# Patient Record
Sex: MALE | Race: WHITE
[De-identification: names, ages, dates, MRNs, and addresses within clinical notes are randomized per-mention and may not be internally consistent; named-entity substitution may affect disease eponyms.]

---

## 2019-06-21 ENCOUNTER — HOSPITAL ENCOUNTER (EMERGENCY)
Dept: HOSPITAL 97 - ER | Age: 64
Discharge: HOME | End: 2019-06-21
Payer: COMMERCIAL

## 2019-06-21 DIAGNOSIS — E11.9: ICD-10-CM

## 2019-06-21 DIAGNOSIS — H53.2: Primary | ICD-10-CM

## 2019-06-21 DIAGNOSIS — E78.5: ICD-10-CM

## 2019-06-21 DIAGNOSIS — Z79.4: ICD-10-CM

## 2019-06-21 DIAGNOSIS — I10: ICD-10-CM

## 2019-06-21 LAB
BUN BLD-MCNC: 27 MG/DL (ref 7–18)
GLUCOSE SERPLBLD-MCNC: 189 MG/DL (ref 74–106)
HCT VFR BLD CALC: 44.5 % (ref 39.6–49)
LYMPHOCYTES # SPEC AUTO: 1.4 K/UL (ref 0.7–4.9)
PMV BLD: 8.9 FL (ref 7.6–11.3)
POTASSIUM SERPL-SCNC: 4.2 MMOL/L (ref 3.5–5.1)
RBC # BLD: 5.48 M/UL (ref 4.33–5.43)

## 2019-06-21 PROCEDURE — 80048 BASIC METABOLIC PNL TOTAL CA: CPT

## 2019-06-21 PROCEDURE — 99284 EMERGENCY DEPT VISIT MOD MDM: CPT

## 2019-06-21 PROCEDURE — 93005 ELECTROCARDIOGRAM TRACING: CPT

## 2019-06-21 PROCEDURE — 70450 CT HEAD/BRAIN W/O DYE: CPT

## 2019-06-21 PROCEDURE — 85025 COMPLETE CBC W/AUTO DIFF WBC: CPT

## 2019-06-21 PROCEDURE — 36415 COLL VENOUS BLD VENIPUNCTURE: CPT

## 2019-06-21 PROCEDURE — 70551 MRI BRAIN STEM W/O DYE: CPT

## 2019-06-21 NOTE — RAD REPORT
EXAM DESCRIPTION:  CT - Head Brain Wo Cont - 6/21/2019 7:51 am

 

CLINICAL HISTORY:  dizziness,blurred vision

Headache, drowsiness, visual disturbances

 

COMPARISON:  <Comparisons>

 

TECHNIQUE:  All CT scans are performed using dose optimization technique as appropriate and may inclu
de automated exposure control or mA/KV adjustment according to patient size.

 

FINDINGS:  No intracranial hemorrhage, hydrocephalus or extra-axial fluid collection.No areas of brai
n edema or evidence of midline shift.

 

The paranasal sinuses and mastoids are clear. The calvarium is intact.

 

IMPRESSION:  No acute intracranial abnormality.

## 2019-06-21 NOTE — EDPHYS
Physician Documentation                                                                           

 [facility]                                                                 

Name: Liam Díaz                                                                             

Age: 63 yrs                                                                                       

Sex: Male                                                                                         

: 1955                                                                                   

MRN: [id number]                                                                                   

Arrival Date: 2019                                                                          

Time: 07:09                                                                                       

Account#: [id number]                                                                            

Bed 6                                                                                             

Private MD:                                                                                       

ED Physician Anatoly Scherer                                                                         

HPI:                                                                                              

                                                                                             

07:23 This 63 yrs old  Male presents to ER via Ambulatory with complaints of         rn  

      Dizziness, double vision.                                                                   

07:23 The patient presents with dizziness. Onset: The symptoms/episode began/occurred         rn  

      yesterday. Modifying factors: The symptoms are alleviated by closing eyes, the symptoms     

      are aggravated by nothing. Severity of symptoms: At their worst the symptoms were mild      

      in the emergency department the symptoms are unchanged. The patient has not experienced     

      similar symptoms in the past. The patient has not recently seen a physician. Reports        

      double vision that began yesterday, no blurred or decreased vision, corrected by            

      closing either eye, no headache or trauma, takes aspirin, here because in past had          

      spontaneous head bleed and required surgery. Does not feel like that episode but wants      

      to make sure. No other neurological complaint. Feels dizzy due to double vision..           

                                                                                                  

Historical:                                                                                       

- Allergies:                                                                                      

07:25 No Known Allergies;                                                                     iw  

- Home Meds:                                                                                      

07:25 amlodipine-benazepril 5-40 mg oral cap 1 cap once daily [Active]; Centrum Silver        iw  

      0.4-300-250 mg-mcg-mcg oral tab daily [Active]; Cinnamon 500 mg oral cap daily              

      [Active]; omeprazole 40 mg Oral cpDR 1 cap 2 times per day [Active]; Ecotrin 81 mg Oral     

      TbEC 1 tab once daily [Active]; escitalopram oxalate 20 mg oral tab 1 tab once daily        

      [Active]; rosuvastatin 20 mg oral tab 1 tab once daily [Active]; Novolin R 40 units         

      Sub-Q daily [Active]; Vascepa 1 gram oral cap daily [Active]; iron oral 65 mg oral once     

      daily [Active]; metoprolol tartrate 25 mg Oral tab 1 tab 2 times per day [Active];          

      Omega 3-6-9 40-60-10 mg-mg-unit oral cap daily [Active]; potassium chloride 20 mEq Oral     

      TbER 1 tab once daily [Active]; Vitamin C Oral twice a day [Active];                        

- PMHx:                                                                                           

07:52 Diabetes - IDDM; Hypertension; Hyperlipidemia;                                          iw  

- PSHx:                                                                                           

07:52 Heart stents; oc holes;                                                               iw  

                                                                                                  

- Immunization history:: Adult Immunizations up to date.                                          

- Social history:: Smoking status: Patient/guardian denies using tobacco.                         

- Family history:: not pertinent.                                                                 

- Ebola Screening: : Patient negative for fever greater than or equal to 101.5 degrees            

  Fahrenheit, and additional compatible Ebola Virus Disease symptoms Patient denies               

  exposure to infectious person Patient denies travel to an Ebola-affected area in the            

  21 days before illness onset No symptoms or risks identified at this time.                      

- Hospitalizations: : No recent hospitalization is reported.                                      

                                                                                                  

                                                                                                  

ROS:                                                                                              

07:25 Constitutional: Negative for fever, chills, and weight loss, Eyes: + double vision, no  rn  

      loss or decreased vision, no drainage or trauma Cardiovascular: Negative for chest          

      pain, palpitations, and edema, Respiratory: Negative for shortness of breath, cough,        

      wheezing, and pleuritic chest pain, Abdomen/GI: Negative for abdominal pain, nausea,        

      vomiting, diarrhea, and constipation, MS/Extremity: Negative for injury and deformity,      

      Skin: Negative for injury, rash, and discoloration, Neuro: Negative for headache,           

      weakness, numbness, tingling, and seizure.                                                  

                                                                                                  

Exam:                                                                                             

07:25 Constitutional:  This is a well developed, well nourished patient who is awake, alert,  rn  

      and in no acute distress. Head/Face:  Normocephalic, atraumatic. Eyes:  Pupils equal        

      round and reactive to light.  Questionable left lateral rectus partial palsy.  Lids and     

      lashes normal.  Conjunctiva and sclera are non-icteric and not injected.  Cornea within     

      normal limits.  Periorbital areas with no swelling, redness, or edema.  Diplopia            

      corrects with closure of either eye.  All visual fields intact to confrontation. ENT:       

      MMM Cardiovascular:  Regular rate and rhythm.  No pulse deficits. Respiratory:  Lungs       

      have equal breath sounds bilaterally, clear to auscultation.  No increased work of          

      breathing, no retractions or nasal flaring. MS/ Extremity:  Pulses equal, no cyanosis.      

      Neurovascular intact.  Full, normal range of motion.  Equal circumference. Neuro:           

      Awake and alert, GCS 15, oriented to person, place, time, and situation.  Cranial           

      nerves II-XII grossly intact.  Motor strength 5/5 in all extremities.  Sensory grossly      

      intact.  Cerebellar exam normal.  Normal gait.                                              

07:44 ECG was reviewed by the Attending Physician.                                            rn  

                                                                                                  

Vital Signs:                                                                                      

07:25  / 85; Pulse 65; Resp 16; Temp 98.3; Pulse Ox 97% on R/A; Weight 108.86 kg;       iw  

      Height 5 ft. 10 in. (177.80 cm); Pain 0/10;                                                 

08:00  / 64; Pulse 64; Resp 16 S; Pulse Ox 96% on R/A;                                  aa5 

09:05  / 77; Pulse 66; Resp 18 S; Temp 98.0(TE); Pulse Ox 98% on R/A; Pain 0/10;        aa5 

07:25 Body Mass Index 34.44 (108.86 kg, 177.80 cm)                                            iw  

                                                                                                  

NIH Stroke Scale Scores:                                                                          

07:20 NIHSS Score: 0                                                                          aa5 

                                                                                                  

MDM:                                                                                              

07:13 Patient medically screened.                                                             rn  

07:25 ED course: Reports just went to eye doctor and had stress test, both were ok, has       rn  

      cataract but otherwise normal..                                                             

09:33 Differential diagnosis: CVA, generalized weakness, idiopathic dizziness, diplopia,      rn  

      cranial nerve palsy, diabetic complication. Data reviewed: vital signs, nurses notes,       

      lab test result(s), EKG, radiologic studies, CT scan, MRI, and as a result, I will          

      discharge patient. Counseling: I had a detailed discussion with the patient and/or          

      guardian regarding: the historical points, exam findings, and any diagnostic results        

      supporting the discharge/admit diagnosis, lab results, radiology results, the need for      

      outpatient follow up, to return to the emergency department if symptoms worsen or           

      persist or if there are any questions or concerns that arise at home. Special               

      discussion: I discussed with the patient/guardian in detail that at this point there is     

      no indication for admission to the hospital. It is understood, however, that if the         

      symptoms persist or worsen the patient needs to return immediately for re-evaluation.       

      Based on the history and exam findings, there is no indication for further emergent         

      testing or inpatient evaluation. I discussed with the patient/guardian the need to see      

      the opthamologist for further evaluation of the symptoms, I discussed with the              

      patient/guardian the need to see the primary care provider for further evaluation of        

      the symptoms.                                                                               

                                                                                                  

                                                                                             

07: Order name: CBC with Diff                                                               rn  

07: Order name: Basic Metabolic Panel                                                       rn  

07:22 Order name: CT Head Brain wo Cont                                                       rn  

                                                                                             

07:37 Order name: Basic Metabolic Panel; Complete Time: 08:19                                 EDMS

                                                                                             

07:37 Order name: CBC with Automated Diff; Complete Time: 08:19                               EDMS

                                                                                             

07:42 Order name: Head Brain Wo Cont; Complete Time: 08:19                                    EDMS

                                                                                             

07:22 Order name: IV Start; Complete Time: 07:35                                              rn  

                                                                                             

07:22 Order name: EKG; Complete Time: 08:25                                                   rn  

                                                                                             

07:22 Order name: EKG - Nurse/Tech; Complete Time: 07:42                                      rn  

                                                                                             

07:42 Order name: EKG Electrocardiogram                                                       EDMS

                                                                                             

08:20 Order name: Brain Wo Cont MRI                                                           rn  

                                                                                             

09:22 Order name: MRI; Complete Time: 09:32                                                   EDMS

                                                                                                  

EC:44 Rate is 66 beats/min. Rhythm is regular. QRS Axis is Normal. HI interval is normal. QRS rn  

      interval is normal. QT interval is normal. No Q waves. T waves are Normal. No ST            

      changes noted. Clinical impression: Normal ECG. Interpreted by me. Reviewed by me.          

                                                                                                  

Administered Medications:                                                                         

No medications were administered                                                                  

                                                                                                  

                                                                                                  

Disposition:                                                                                      

19 09:34 Discharged to Home. Impression: Diplopia.                                          

- Condition is Stable.                                                                            

- Discharge Instructions: Diplopia.                                                               

                                                                                                  

- Medication Reconciliation Form, Thank You Letter, Antibiotic Education, Prescription            

  Opioid Use form.                                                                                

- Follow up: Cheryl Steinberg MD; When: As needed; Reason: Recheck today's complaints,             

  Re-evaluation by your physician.                                                                

- Problem is new.                                                                                 

- Symptoms are unchanged.                                                                         

                                                                                                  

                                                                                                  

                                                                                                  

                                                                                                  

NIH Stroke Scale - NIH Stroke Score                                                               

Date: 2019                                                                                  

Time: 07:20                                                                                       

Total Score = 0                                                                                   

  1a. Level of Consciousness (LOC) - 0(Alert)                                                     

  1b. Level of Consciousness (LOC) (Year \T\ Age) - 0(Both)                                       

  1c. LOC Commands (Open \T\ Closes Eyes/) - 0(Both)                                          

   2. Best Gaze (Lateral Gaze Paresis) - 0(Normal)                                                

   3. Visual Field Loss - 0(No visual loss)                                                       

   4. Facial Palsy - 0(Normal)                                                                    

  5a. Left Arm: Motor (10-second hold) - 0(No drift)                                              

  5b. Right Arm: Motor (10-second hold) - 0(No drift)                                             

  6a. Left Leg: Motor (5-second hold - always test supine) - 0(No drift)                          

  6b. Right Leg: Motor (5-second hold - always test supine) - 0(No drift)                         

   7. Limb Ataxia (finger/nose \T\ heel/shin - test with eyes open) - 0(Absent)                   

   8. Sensory Loss (pinprick arms/legs/face) - 0(Normal)                                          

   9. Best Language: Aphasia (description/naming/reading) - 0(No aphasia)                         

  10. Dysarthria (speech clarity - read or repeat words) - 0(Normal)                              

  11. Extinction and Inattention (visual/tactile/auditory/spatial/personal) - 0(No                

      abnormality)                                                                                

Initials: aa5                                                                                     

                                                                                                  

Signatures:                                                                                       

Dispatcher [employer]                           EDMichelle Obregon RN RN iw Nieto, Roman, MD MD rn Calderon, Audri, RN RN   aa5                                                  

                                                                                                  

Corrections: (The following items were deleted from the chart)                                    

07:44 07:42 Head Brain Wo Cont ordered. EDMS                                          EDMS        

09:48 09:34 2019 09:34 Discharged to Home. Impression: Diplopia. Condition is   aa5         

      Stable. Forms are Medication Reconciliation Form, Thank You Letter, Antibiotic              

      Education, Prescription Opioid Use. Follow up: Cheryl Steinberg; When: As needed;              

      Reason: Recheck today's complaints, Re-evaluation by your physician. Problem is             

      new. Symptoms are unchanged. rn                                                             

                                                                                                  

**************************************************************************************************

## 2019-06-21 NOTE — EKG
Test Date:    2019-06-21               Test Time:    07:44:38

Technician:   LIZ                                     

                                                     

MEASUREMENT RESULTS:                                       

Intervals:                                           

Rate:         66                                     

OH:           156                                    

QRSD:         108                                    

QT:           420                                    

QTc:          440                                    

Axis:                                                

P:            38                                     

OH:           156                                    

QRS:          49                                     

T:            60                                     

                                                     

INTERPRETIVE STATEMENTS:                                       

                                                     

Normal sinus rhythm

Normal ECG

No previous ECG available for comparison



Electronically Signed On 06-21-19 10:47:35 CDT by Ganesh Lui

## 2019-06-21 NOTE — ER
Nurse's Notes                                                                                     

 [facility]                                                                 

Name: Liam Díaz                                                                             

Age: 63 yrs                                                                                       

Sex: Male                                                                                         

: 1955                                                                                   

MRN: [id number]                                                                                   

Arrival Date: 2019                                                                          

Time: 07:09                                                                                       

Account#: [id number]                                                                            

Bed 6                                                                                             

Private MD:                                                                                       

Diagnosis: Diplopia                                                                               

                                                                                                  

Presentation:                                                                                     

                                                                                             

07:20 Presenting complaint: Patient states: woke up with dizziness and double vision          iw  

      yesterday morning, hx of brain bleed 6 months ago, states he was on plavix for a long       

      time, denies trauma, denies headache or weakness today, reports felling tingling in         

      right fingertips. Transition of care: patient was not received from another setting of      

      care. Onset of symptoms was 2019. Risk Assessment: Do you want to hurt             

      yourself or someone else? Patient reports no desire to harm self or others. Initial         

      Sepsis Screen: Does the patient meet any 2 criteria? No. Patient's initial sepsis           

      screen is negative. Does the patient have a suspected source of infection?. Care prior      

      to arrival: None.                                                                           

07:20 Method Of Arrival: Ambulatory                                                           iw  

07:20 Acuity: MANUEL 2                                                                           iw  

                                                                                                  

Historical:                                                                                       

- Allergies:                                                                                      

07:25 No Known Allergies;                                                                     iw  

- Home Meds:                                                                                      

07:25 amlodipine-benazepril 5-40 mg oral cap 1 cap once daily [Active]; Centrum Silver        iw  

      0.4-300-250 mg-mcg-mcg oral tab daily [Active]; Cinnamon 500 mg oral cap daily              

      [Active]; omeprazole 40 mg Oral cpDR 1 cap 2 times per day [Active]; Ecotrin 81 mg Oral     

      TbEC 1 tab once daily [Active]; escitalopram oxalate 20 mg oral tab 1 tab once daily        

      [Active]; rosuvastatin 20 mg oral tab 1 tab once daily [Active]; Novolin R 40 units         

      Sub-Q daily [Active]; Vascepa 1 gram oral cap daily [Active]; iron oral 65 mg oral once     

      daily [Active]; metoprolol tartrate 25 mg Oral tab 1 tab 2 times per day [Active];          

      Omega 3-6-9 40-60-10 mg-mg-unit oral cap daily [Active]; potassium chloride 20 mEq Oral     

      TbER 1 tab once daily [Active]; Vitamin C Oral twice a day [Active];                        

- PMHx:                                                                                           

07:52 Diabetes - IDDM; Hypertension; Hyperlipidemia;                                          iw  

- PSHx:                                                                                           

07:52 Heart stents; oc holes;                                                               iw  

                                                                                                  

- Immunization history:: Adult Immunizations up to date.                                          

- Social history:: Smoking status: Patient/guardian denies using tobacco.                         

- Family history:: not pertinent.                                                                 

- Ebola Screening: : Patient negative for fever greater than or equal to 101.5 degrees            

  Fahrenheit, and additional compatible Ebola Virus Disease symptoms Patient denies               

  exposure to infectious person Patient denies travel to an Ebola-affected area in the            

  21 days before illness onset No symptoms or risks identified at this time.                      

- Hospitalizations: : No recent hospitalization is reported.                                      

                                                                                                  

                                                                                                  

Screenin:36 Abuse screen: Denies threats or abuse. Nutritional screening: No deficits noted.        aa5 

      Tuberculosis screening: No symptoms or risk factors identified.                             

08:00 Fall Risk IV access (20 points). Total Cody Fall Scale indicates No Risk (0-24 pts).   aa5 

                                                                                                  

Assessment:                                                                                       

07:20 General: Appears comfortable, Behavior is calm, cooperative. Pain: Denies pain. Neuro:  aa5 

      Level of Consciousness is awake, alert, obeys commands, Oriented to person, place,          

      time, situation,  are equal bilaterally Moves all extremities. Gait is steady,         

      Speech is normal, Facial symmetry appears normal, Pupils are PERRLA, Reports diplopia,      

      dizziness, tingling to right fingertips . Denies weakness blurred vision headache.          

      Cardiovascular: Heart tones S1 S2 present Rhythm is regular. Respiratory: Airway is         

      patent Respiratory effort is even, unlabored, Respiratory pattern is regular,               

      symmetrical. GI: No signs and/or symptoms were reported involving the gastrointestinal      

      system. : No signs and/or symptoms were reported regarding the genitourinary system.      

      EENT: No signs and/or symptoms were reported regarding the EENT system. Derm: Skin is       

      pink, warm \T\ dry. Musculoskeletal: Range of motion: intact in all extremities.            

07:45 Reassessment: Pt taken to CT via stretcher .                                            aa5 

08:30 Reassessment: Pt to MRI.                                                                aa5 

09:00 Reassessment: Patient is alert, oriented x 3, equal unlabored respirations, skin        aa5 

      warm/dry/pink. Pt back from MRI. Pt sitting up watching TV. Pt notified of wait time        

      for MRI results. Pt verbalized understanding. Pt reports diplopia has not improved. .       

09:45 Reassessment: Patient is alert, oriented x 3, equal unlabored respirations, skin        aa5 

      warm/dry/pink. Patient denies pain at this time.                                            

                                                                                                  

Vital Signs:                                                                                      

07:25  / 85; Pulse 65; Resp 16; Temp 98.3; Pulse Ox 97% on R/A; Weight 108.86 kg;       iw  

      Height 5 ft. 10 in. (177.80 cm); Pain 0/10;                                                 

08:00  / 64; Pulse 64; Resp 16 S; Pulse Ox 96% on R/A;                                  aa5 

09:05  / 77; Pulse 66; Resp 18 S; Temp 98.0(TE); Pulse Ox 98% on R/A; Pain 0/10;        aa5 

07:25 Body Mass Index 34.44 (108.86 kg, 177.80 cm)                                            iw  

                                                                                                  

NIH Stroke Scale Scores:                                                                          

07:20 NIHSS Score: 0                                                                          aa5 

                                                                                                  

ED Course:                                                                                        

07:09 Patient arrived in ED.                                                                  mr  

07:13 Anatoly Scherer MD is Attending Physician.                                                rn  

07:14 Teresa Jones, RN is Primary Nurse.                                                   aa5 

07:20 Arm band placed on Patient placed in an exam room, on a stretcher.                      aa5 

07:20 Patient has correct armband on for positive identification. Bed in low position. Call   aa5 

      light in reach. Side rails up X 1.                                                          

07:21 Triage completed.                                                                       iw  

07:30 Initial lab(s) drawn, by me, sent to lab. Inserted saline lock: 20 gauge in right       aa5 

      antecubital area, using aseptic technique. Blood collected.                                 

07:44 EKG done, by EKG tech. reviewed by Anatoly Scherer MD.                                      at1 

07:47 No provider procedures requiring assistance completed.                                  aa5 

07:51 Head Brain Wo Cont In Process Unspecified.                                              EDMS

08:43 Patient moved to MRI via wheelchair.                                                    em2 

09:21 MRI completed. Patient tolerated well. Patient moved back from MRI.                     em2 

09:34 Cheryl Steinberg MD is Referral Physician.                                               rn  

09:45 IV discontinued, intact, bleeding controlled, No redness/swelling at site. Pressure     aa5 

      dressing applied.                                                                           

                                                                                                  

Administered Medications:                                                                         

No medications were administered                                                                  

                                                                                                  

                                                                                                  

Outcome:                                                                                          

09:34 Discharge ordered by MD.                                                                rn  

09:45 Discharged to home ambulatory.                                                          aa5 

09:45 Condition: stable                                                                           

09:45 Discharge instructions given to patient, Instructed on discharge instructions, follow       

      up and referral plans. Demonstrated understanding of instructions, follow-up care.          

09:48 Patient left the ED.                                                                    aa5 

                                                                                                  

                                                                                                  

NIH Stroke Scale - NIH Stroke Score                                                               

Date: 2019                                                                                  

Time: 07:20                                                                                       

Total Score = 0                                                                                   

  1a. Level of Consciousness (LOC) - 0(Alert)                                                     

  1b. Level of Consciousness (LOC) (Year \T\ Age) - 0(Both)                                       

  1c. LOC Commands (Open \T\ Closes Eyes/) - 0(Both)                                          

   2. Best Gaze (Lateral Gaze Paresis) - 0(Normal)                                                

   3. Visual Field Loss - 0(No visual loss)                                                       

   4. Facial Palsy - 0(Normal)                                                                    

  5a. Left Arm: Motor (10-second hold) - 0(No drift)                                              

  5b. Right Arm: Motor (10-second hold) - 0(No drift)                                             

  6a. Left Leg: Motor (5-second hold - always test supine) - 0(No drift)                          

  6b. Right Leg: Motor (5-second hold - always test supine) - 0(No drift)                         

   7. Limb Ataxia (finger/nose \T\ heel/shin - test with eyes open) - 0(Absent)                   

   8. Sensory Loss (pinprick arms/legs/face) - 0(Normal)                                          

   9. Best Language: Aphasia (description/naming/reading) - 0(No aphasia)                         

  10. Dysarthria (speech clarity - read or repeat words) - 0(Normal)                              

  11. Extinction and Inattention (visual/tactile/auditory/spatial/personal) - 0(No                

      abnormality)                                                                                

Initials: aa5                                                                                     

                                                                                                  

Signatures:                                                                                       

Dispatcher [employer]                           [facility]                                                 

Joy White Irene, LESLEE CAMILO   iw                                                   

Anatoly Scherer MD MD rn Calderon, Audri, RN                     RN   aa5                                                  

Vijay Huynh                              em2                                                  

Nae Leon, EKG Tech              EKG Tat1                                                  

                                                                                                  

Corrections: (The following items were deleted from the chart)                                    

07:50 07:25  / 85; Pulse 65bpm; Resp 16bpm; Pulse Ox 97% RA; Temp 98.3F; iw     iw          

09:06 09:05 Pulse 66bpm; Resp 18bpm; Spontaneous; Pulse Ox 98% RA; Temp 98.0F         aa5         

      Temporal; Pain 0/10; aa5                                                                    

09:06 09:00 Reassessment: Patient is alert, oriented x 3, equal unlabored             aa5         

      respirations, skin warm/dry/pink. Pt back from MRI. aa5                                     

                                                                                                  

**************************************************************************************************

## 2019-06-21 NOTE — RAD REPORT
EXAM DESCRIPTION:  MRI - Brain Wo Cont - 6/21/2019 8:54 am

 

CLINICAL HISTORY:  Dizziness, blurred vision, stroke-like symptoms

 

COMPARISON:  CT June 21

 

TECHNIQUE:  Sagittal T1-weighted images were obtained along with axial PD, heavily T2-weighted and T2
-FLAIR images. Axial DWI and ADC mapping sequences were also obtained along with coronal heavily T2-w
eighted images.

 

FINDINGS:  No intracranial hemorrhage, mass or acute infarction. There is no edema or shift of midlin
e structures. No extra-axial fluid collections. Gray-matter/white matter junction is preserved. Signa
l voids are seen as a normal finding in the major intracranial vessels.

 

Mild atrophy changes are present. Ventricles are in proportion to the amount of volume loss. Punctate
 white matter signal abnormalities are scattered in the cerebral white matter. In a patient this age 
this is most likely chronic ischemic change. Basal ganglia, thalamus and brainstem tissue clear of an
y measurable chronic ischemic change.

 

Mastoid air cells and paranasal sinuses are clear.

 

 

IMPRESSION:  Mild atrophy and mild chronic ischemic change. No acute intracranial finding.

## 2019-08-07 ENCOUNTER — HOSPITAL ENCOUNTER (EMERGENCY)
Dept: HOSPITAL 97 - ER | Age: 64
Discharge: HOME | End: 2019-08-07
Payer: COMMERCIAL
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
## 2019-08-07 DIAGNOSIS — S62.601B: Primary | ICD-10-CM

## 2019-08-07 DIAGNOSIS — Z23: ICD-10-CM
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
## 2019-08-07 DIAGNOSIS — S61.311A: ICD-10-CM

## 2019-08-07 DIAGNOSIS — E11.9: ICD-10-CM

## 2019-08-07 DIAGNOSIS — Z95.818: ICD-10-CM

## 2019-08-07 DIAGNOSIS — I10: ICD-10-CM
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
## 2019-08-07 DIAGNOSIS — Z79.4: ICD-10-CM

## 2019-08-07 DIAGNOSIS — E78.5: ICD-10-CM

## 2019-08-07 PROCEDURE — 12001 RPR S/N/AX/GEN/TRNK 2.5CM/<: CPT

## 2019-08-07 PROCEDURE — 90471 IMMUNIZATION ADMIN: CPT

## 2019-08-07 PROCEDURE — 96372 THER/PROPH/DIAG INJ SC/IM: CPT

## 2019-08-07 PROCEDURE — 73130 X-RAY EXAM OF HAND: CPT

## 2019-08-07 PROCEDURE — 99284 EMERGENCY DEPT VISIT MOD MDM: CPT

## 2019-08-07 PROCEDURE — 90714 TD VACC NO PRESV 7 YRS+ IM: CPT

## 2019-08-07 PROCEDURE — 0JQK0ZZ REPAIR LEFT HAND SUBCUTANEOUS TISSUE AND FASCIA, OPEN APPROACH: ICD-10-PCS

## 2019-08-07 NOTE — EDPHYS
Physician Documentation                                                                           

 [facility]                                                                 

Name: Liam Díaz                                                                             

Age: 64 yrs                                                                                       

Sex: Male                                                                                         

: 1955                                                                                   

MRN: [id number]                                                                                   

Arrival Date: 2019                                                                          

Time: 17:04                                                                                       

Account#: [id number]                                                                            

Bed 13                                                                                            

Private MD:                                                                                       

ED Physician Ramsey Link                                                                      

HPI:                                                                                              

                                                                                             

17:39 This 64 yrs old  Male presents to ER via Ambulatory with complaints of Finger  jr8 

      Injury.                                                                                     

17:39 Onset: The symptoms/episode began/occurred acutely, today. The patient has not          jr8 

      experienced similar symptoms in the past. The patient has not recently seen a               

      physician. Was using table saw and kicked back causing laceration distal tip second         

      digit affecting nail bed as well .                                                          

                                                                                                  

Historical:                                                                                       

- Allergies:                                                                                      

17:09 No Known Allergies;                                                                     aj  

- Home Meds:                                                                                      

17:09 amlodipine-benazepril 5-40 mg Oral cap 1 cap once daily [Active]; Centrum Silver        aj  

      0.4-300-250 mg-mcg-mcg Oral tab daily [Active]; Cinnamon 500 mg Oral cap daily              

      [Active]; Ecotrin 81 mg Oral TbEC 1 tab once daily [Active]; escitalopram oxalate 20 mg     

      Oral tab 1 tab once daily [Active]; iron 65 mg Oral once daily [Active]; metoprolol         

      tartrate 25 mg Oral tab 1 tab 2 times per day [Active]; Novolin R 40 units Sub-Q daily      

      [Active]; Omega 3-6-9 40-60-10 mg-mg-unit Oral cap daily [Active]; omeprazole 40 mg         

      Oral cpDR 1 cap 2 times per day [Active]; potassium chloride 20 mEq Oral TbER 1 tab         

      once daily [Active]; rosuvastatin 20 mg Oral tab 1 tab once daily [Active]; Vascepa 1       

      gram Oral cap daily [Active]; Vitamin C Oral twice a day [Active];                          

- PMHx:                                                                                           

17:09 Diabetes - IDDM; Hyperlipidemia; Hypertension;                                          aj  

- PSHx:                                                                                           

17:09 Heart stents; oc holes;                                                               aj  

                                                                                                  

- Immunization history:: Last tetanus immunization: unknown.                                      

- Social history:: Smoking status: Patient/guardian denies using tobacco.                         

- Ebola Screening: : Patient negative for fever greater than or equal to 101.5 degrees            

  Fahrenheit, and additional compatible Ebola Virus Disease symptoms Patient denies               

  exposure to infectious person Patient denies travel to an Ebola-affected area in the            

  21 days before illness onset No symptoms or risks identified at this time.                      

                                                                                                  

                                                                                                  

ROS:                                                                                              

17:39 Eyes: Negative for injury, pain, redness, and discharge, ENT: Negative for injury,      jr8 

      pain, and discharge, Neck: Negative for injury, pain, and swelling, Cardiovascular:         

      Negative for chest pain, palpitations, and edema, Respiratory: Negative for shortness       

      of breath, cough, wheezing, and pleuritic chest pain, Abdomen/GI: Negative for              

      abdominal pain, nausea, vomiting, diarrhea, and constipation, Back: Negative for injury     

      and pain, Skin: Negative for injury, rash, and discoloration, Neuro: Negative for           

      headache, weakness, numbness, tingling, and seizure.                                        

17:39 MS/extremity: Positive for injury or acute deformity, laceration, pain, tenderness, of      

      the dorsal aspect of distal phalanx of left index finger.                                   

                                                                                                  

Exam:                                                                                             

17:39 Eyes:  Pupils equal round and reactive to light, extra-ocular motions intact.  Lids and jr8 

      lashes normal.  Conjunctiva and sclera are non-icteric and not injected.  Cornea within     

      normal limits.  Periorbital areas with no swelling, redness, or edema. ENT:  Nares          

      patent. No nasal discharge, no septal abnormalities noted.  Tympanic membranes are          

      normal and external auditory canals are clear.  Oropharynx with no redness, swelling,       

      or masses, exudates, or evidence of obstruction, uvula midline.  Mucous membranes           

      moist. Neck:  Trachea midline, no thyromegaly or masses palpated, and no cervical           

      lymphadenopathy.  Supple, full range of motion without nuchal rigidity, or vertebral        

      point tenderness.  No Meningismus. Cardiovascular:  Regular rate and rhythm with a          

      normal S1 and S2.  No gallops, murmurs, or rubs.  Normal PMI, no JVD.  No pulse             

      deficits. Respiratory:  Lungs have equal breath sounds bilaterally, clear to                

      auscultation and percussion.  No rales, rhonchi or wheezes noted.  No increased work of     

      breathing, no retractions or nasal flaring. Abdomen/GI:  Soft, non-tender, with normal      

      bowel sounds.  No distension or tympany.  No guarding or rebound.  No evidence of           

      tenderness throughout. Back:  No spinal tenderness.  No costovertebral tenderness.          

      Full range of motion. Skin:  Warm, dry with normal turgor.  Normal color with no            

      rashes, no lesions, and no evidence of cellulitis. Neuro:  Awake and alert, GCS 15,         

      oriented to person, place, time, and situation.  Cranial nerves II-XII grossly intact.      

      Motor strength 5/5 in all extremities.  Sensory grossly intact.  Cerebellar exam            

      normal.  Normal gait.                                                                       

17:39 Musculoskeletal/extremity: Extremities: grossly normal except: noted in the dorsal          

      aspect of distal phalanx of left index finger: Patient has laceration to distal tip of      

      2nd digit involving the nailbed. Pad of finger intact. Partial amputation present.          

      Bleeding controlled , ROM: intact in all extremities, Circulation is intact in all          

      extremities. Sensation intact.                                                              

                                                                                                  

Vital Signs:                                                                                      

17:09  / 66; Pulse 81; Resp 16; Temp 97.4; Pulse Ox 95% on R/A; Weight 117.93 kg;       aj  

      Height 5 ft. 11 in. (180.34 cm);                                                            

18:00  / 87; Pulse 78; Resp 18; Pulse Ox 99% on R/A;                                    wh  

19:26  / 78; Pulse 75; Resp 18; Pulse Ox 99% on R/A;                                    wh  

17:09 Body Mass Index 36.26 (117.93 kg, 180.34 cm)                                            aj  

                                                                                                  

Laceration:                                                                                       

18:54 Wound Repair of 2.5cm ( 1.0in ) full thickness laceration to dorsal aspect of distal    jr8 

      phalanx of left index finger. Distal neuro/vascular/tendon intact. Anesthesia: Digital      

      block administered with 4 mls of 0.5% marcaine. Wound prep: Extensive cleansing with        

      betadine, Wound irrigation with saline, Wound explored extensively, Copious irrigation.     

      Skin closed with 4 4-0 Prolene using interrupted sutures and sterile technique. tuft        

      closed with 2 4-0 chromic using interrupted sutures and sterile technique. Patient          

      tolerated well.                                                                             

                                                                                                  

MDM:                                                                                              

17:15 Patient medically screened.                                                             jr8 

18:54 Data reviewed: vital signs, nurses notes, radiologic studies, plain films, and as a     jr8 

      result, I will discharge patient. Data interpreted: Pulse oximetry: on room air is 99       

      %. Interpretation: normal. Counseling: I had a detailed discussion with the patient         

      and/or guardian regarding: the historical points, exam findings, and any diagnostic         

      results supporting the discharge/admit diagnosis, radiology results, the need for           

      outpatient follow up, a hand specialist, to return to the emergency department if           

      symptoms worsen or persist or if there are any questions or concerns that arise at home.    

                                                                                                  

                                                                                             

17:21 Order name: XRAY Hand LEFT 3 View; Complete Time: 17:48                                 [facility] 

                                                                                             

17:22 Order name: Chromic, Sutures; Complete Time: 17:38                                      [facility] 

                                                                                             

17:22 Order name: Prolene, Sutures; Complete Time: 17:38                                      [facility] 

                                                                                             

17:22 Order name: Dressing - Wound; Complete Time: 17:38                                      [facility] 

                                                                                             

17:22 Order name: Gloves, Sterile; Complete Time: 17:39                                       [facility] 

                                                                                             

17:22 Order name: Setup Suture Tray; Complete Time: 17:39                                      

                                                                                                  

Administered Medications:                                                                         

17:22 CANCELLED (Physician Discretion): Bupivacaine (0.5 %) 11 vials 10 ml Infiltration once  [facility] 

19:07 Drug: Tetanus-Diphtheria Toxoid Adult 0.5 ml {: [employer]. Exp:           

      2021. Lot #: [id number]. } Route: IM; Site: left deltoid;                                 

19:27 Follow up: Response: No adverse reaction                                                  

19:07 Drug: Ancef 1 grams Route: IM; Site: right gluteus;                                       

19:27 Follow up: Response: No adverse reaction                                                  

19:07 Drug: Bupivacaine (0.5 %) 1 vials {Note: Admisntered by Yessica .} Volume: 10 ml; Route:   

      Infiltration;                                                                               

                                                                                                  

                                                                                                  

Disposition:                                                                                      

                                                                                             

09:12 Co-signature as Attending Physician, Ramsey Link MD I agree with the assessment and  sierra 

      plan of care.                                                                               

                                                                                                  

Disposition:                                                                                      

19 18:57 Discharged to Home. Impression: Laceration without foreign body of left index      

  finger with damage to nail, Open fracuter left index finger.                                    

- Condition is Stable.                                                                            

- Discharge Instructions: Finger Fracture, Laceration Care, Adult.                                

- Prescriptions for Keflex 500 mg Oral Capsule - take 1 capsule by ORAL route every 6             

  hours for 7 days; 28 capsule. Tylenol- Codeine #3 300-30 mg Oral Tablet - take 2                

  tablets by ORAL route every 6 hours As needed; 12 tablet.                                       

- Medication Reconciliation Form, Thank You Letter, Antibiotic Education, Prescription            

  Opioid Use form.                                                                                

- Follow up: Daniel Mcdonough MD; When: 1 - 2 days; Reason: Wound Recheck, Recheck                

  today's complaints, Continuance of care, Re-evaluation by your physician.                       

- Problem is new.                                                                                 

- Symptoms have improved.                                                                         

                                                                                                  

                                                                                                  

                                                                                                  

Signatures:                                                                                       

Dispatcher [employer]                           EDNae Alfaro, Ramsey Valladares RN, MD MD cha Roszak, Josh, PA PA   jr8                                                  

Terry Gallardoanette                                wh                                                   

                                                                                                  

Corrections: (The following items were deleted from the chart)                                    

                                                                                             

17:22 17:22 Bupivacaine (0.5 %) 11 vials 10 ml Infiltration once ordered. jr8                 jr8 

19:26 18:57 2019 18:57 Discharged to Home. Impression: Laceration without foreign body  wh  

      of left index finger with damage to nail; Open fracuter left index finger. Condition is     

      Stable. Forms are Medication Reconciliation Form, Thank You Letter, Antibiotic              

      Education, Prescription Opioid Use. Follow up: Daniel Mcdonough; When: 1 - 2 days;            

      Reason: Wound Recheck, Recheck today's complaints, Continuance of care, Re-evaluation       

      by your physician. Problem is new. Symptoms have improved. jr8                              

                                                                                                  

**************************************************************************************************

## 2019-08-07 NOTE — RAD REPORT
EXAM DESCRIPTION:  RAD -Hand Left 3 View - 8/7/2019 5:37 pm

 

CLINICAL HISTORY:

Left hand pain status post injury

 

FINDINGS:  Comminuted fracture involves the distal aspect of the second distal phalanx. No dislocatio
n.

## 2019-08-07 NOTE — ER
Nurse's Notes                                                                                     

 [facility]                                                                 

Name: Liam Díaz                                                                             

Age: 64 yrs                                                                                       

Sex: Male                                                                                         

: 1955                                                                                   

MRN: [id number]                                                                                   

Arrival Date: 2019                                                                          

Time: 17:04                                                                                       

Account#: [id number]                                                                            

Bed 13                                                                                            

Private MD:                                                                                       

Diagnosis: Laceration without foreign body of left index finger with damage to nail;Open fracuter 

  left index finger                                                                               

                                                                                                  

Presentation:                                                                                     

                                                                                             

17:08 Presenting complaint: Patient states: Distal left 2nd digit VS hand saw. Transition of    

      care: patient was not received from another setting of care. Onset of symptoms was          

      2019. Risk Assessment: Do you want to hurt yourself or someone else? Patient     

      reports no desire to harm self or others. Initial Sepsis Screen: Does the patient meet      

      any 2 criteria? No. Patient's initial sepsis screen is negative. Does the patient have      

      a suspected source of infection? No. Patient's initial sepsis screen is negative. Care      

      prior to arrival: None.                                                                     

17:08 Method Of Arrival: Ambulatory                                                           aj  

17:08 Acuity: MANUEL 4                                                                           aj  

                                                                                                  

Triage Assessment:                                                                                

17:09 General: Appears in no apparent distress. comfortable, Behavior is calm, cooperative,   aj  

      appropriate for age. Pain: Complains of pain in dorsal aspect of distal phalanx of left     

      index finger. Neuro: Level of Consciousness is awake, alert, obeys commands, Oriented       

      to person, place, time, situation, Appropriate for age. Respiratory: Airway is patent       

      Respiratory effort is even, unlabored, Respiratory pattern is regular, symmetrical.         

      Derm: Skin is intact, is healthy with good turgor, Skin is pink, warm \T\ dry. normal.      

      Injury Description: Laceration sustained to dorsal aspect of distal phalanx of left         

      index finger.                                                                               

                                                                                                  

Historical:                                                                                       

- Allergies:                                                                                      

17:09 No Known Allergies;                                                                     aj  

- Home Meds:                                                                                      

17:09 amlodipine-benazepril 5-40 mg Oral cap 1 cap once daily [Active]; Centrum Silver        aj  

      0.4-300-250 mg-mcg-mcg Oral tab daily [Active]; Cinnamon 500 mg Oral cap daily              

      [Active]; Ecotrin 81 mg Oral TbEC 1 tab once daily [Active]; escitalopram oxalate 20 mg     

      Oral tab 1 tab once daily [Active]; iron 65 mg Oral once daily [Active]; metoprolol         

      tartrate 25 mg Oral tab 1 tab 2 times per day [Active]; Novolin R 40 units Sub-Q daily      

      [Active]; Omega 3-6-9 40-60-10 mg-mg-unit Oral cap daily [Active]; omeprazole 40 mg         

      Oral cpDR 1 cap 2 times per day [Active]; potassium chloride 20 mEq Oral TbER 1 tab         

      once daily [Active]; rosuvastatin 20 mg Oral tab 1 tab once daily [Active]; Vascepa 1       

      gram Oral cap daily [Active]; Vitamin C Oral twice a day [Active];                          

- PMHx:                                                                                           

17:09 Diabetes - IDDM; Hyperlipidemia; Hypertension;                                          aj  

- PSHx:                                                                                           

17:09 Heart stents; oc holes;                                                               aj  

                                                                                                  

- Immunization history:: Last tetanus immunization: unknown.                                      

- Social history:: Smoking status: Patient/guardian denies using tobacco.                         

- Ebola Screening: : Patient negative for fever greater than or equal to 101.5 degrees            

  Fahrenheit, and additional compatible Ebola Virus Disease symptoms Patient denies               

  exposure to infectious person Patient denies travel to an Ebola-affected area in the            

  21 days before illness onset No symptoms or risks identified at this time.                      

                                                                                                  

                                                                                                  

Screenin:49 Abuse screen: Denies threats or abuse. Denies injuries from another. Nutritional        wh  

      screening: No deficits noted. Tuberculosis screening: No symptoms or risk factors           

      identified. Fall Risk None identified.                                                      

                                                                                                  

Assessment:                                                                                       

18:36 General: Appears in no apparent distress. Behavior is calm, cooperative, appropriate    wh  

      for age. Pain: Complains of pain in left index finger, dorsal aspect of distal phalanx      

      of left index finger and left index fingernail Pain does not radiate. Pain currently is     

      6 out of 10 on a pain scale. Neuro: Level of Consciousness is awake, alert, obeys           

      commands, Oriented to person, place, time, situation, Appropriate for age.                  

      Cardiovascular: Capillary refill < 3 seconds. Respiratory: Airway is patent Respiratory     

      effort is even, unlabored, Respiratory pattern is regular, symmetrical. GI: Abdomen is      

      flat, non-distended. : No signs and/or symptoms were reported regarding the               

      genitourinary system. EENT: No signs and/or symptoms were reported regarding the EENT       

      system. Derm: Skin is intact, is healthy with good turgor, Skin is pink, warm \T\ dry.      

      normal. Musculoskeletal: Range of motion: intact in all extremities. Injury                 

      Description: Laceration sustained to left index finger, dorsal aspect of distal phalanx     

      of left index finger and left index fingernail is clean, full thickness, was sustained      

      30-60 minutes ago. a small amount of bleeding noted at this time.                           

19:24 Reassessment: Patient appears in no apparent distress at this time. Patient and/or        

      family updated on plan of care and expected duration. Pain level reassessed. Patient is     

      alert, oriented x 3, equal unlabored respirations, skin warm/dry/pink. Patient denies       

      pain at this time.                                                                          

                                                                                                  

Vital Signs:                                                                                      

17:09  / 66; Pulse 81; Resp 16; Temp 97.4; Pulse Ox 95% on R/A; Weight 117.93 kg;       aj  

      Height 5 ft. 11 in. (180.34 cm);                                                            

18:00  / 87; Pulse 78; Resp 18; Pulse Ox 99% on R/A;                                    wh  

19:26  / 78; Pulse 75; Resp 18; Pulse Ox 99% on R/A;                                      

17:09 Body Mass Index 36.26 (117.93 kg, 180.34 cm)                                              

                                                                                                  

ED Course:                                                                                        

17:04 Patient arrived in ED.                                                                  as  

17:08 Triage completed.                                                                       aj  

17:09 Arm band placed on left wrist. Patient placed in an exam room.                            

17:15 Wolfgang Juan PA is PHCP.                                                               [id number] 

17:15 Ramsey Link MD is Attending Physician.                                             [id number] 

17:22 Kenia Gallardo is Primary Nurse.                                                           

17:38 XRAY Hand LEFT 3 View In Process Unspecified.                                           EDMS

18:30 Assist provider with laceration repair on left index finger, dorsal aspect of distal    wh  

      phalanx of left index finger and left index fingernail that was 2.5 cm. or less using       

      sutures. Set up tray. Performed by Wolfgang CAREY Dressed with 4X4s, Patient tolerated      

      well. Patient did not have IV access during this emergency room visit.                      

18:49 Patient has correct armband on for positive identification. Pulse ox on. NIBP on.         

18:56 Daniel Mcdonough MD is Referral Physician.                                              [email or web address] 

                                                                                                  

Administered Medications:                                                                         

17:22 CANCELLED (Physician Discretion): Bupivacaine (0.5 %) 11 vials 10 ml Infiltration once  [id number] 

19:07 Drug: Tetanus-Diphtheria Toxoid Adult 0.5 ml {: [employer]. Exp:           

      2021. Lot #: [id number]. } Route: IM; Site: left deltoid;                                 

19:27 Follow up: Response: No adverse reaction                                                  

19:07 Drug: Ancef 1 grams Route: IM; Site: right gluteus;                                       

19:27 Follow up: Response: No adverse reaction                                                  

19: Drug: Bupivacaine (0.5 %) 1 vials {Note: Admisntered by Yessica .} Volume: 10 ml; Route: wh  

      Infiltration;                                                                               

                                                                                                  

                                                                                                  

Outcome:                                                                                          

18:57 Discharge ordered by MD. ruffin 

19:25 Discharged to home ambulatory.                                                            

19:25 Condition: good                                                                             

19:25 Discharge instructions given to patient, Instructed on discharge instructions, follow       

      up and referral plans. no drinking with medication, no driving heavy equipment,             

      medication usage, wound care, Demonstrated understanding of instructions, follow-up         

      care, medications, wound care, Prescriptions given X 2.                                     

19:26 Patient left the ED.                                                                      

                                                                                                  

Signatures:                                                                                       

Dispatcher [employer]                           EDMS                                                 

Nae Baca RN RN aj Martinez, Amelia as Roszak, Josh, PA PA jr8 Habalo, Winsy                                                                                   

                                                                                                  

**************************************************************************************************

## 2019-10-09 ENCOUNTER — HOSPITAL ENCOUNTER (EMERGENCY)
Dept: HOSPITAL 97 - ER | Age: 64
Discharge: HOME | End: 2019-10-09
Payer: COMMERCIAL

## 2019-10-09 VITALS — TEMPERATURE: 98.1 F | OXYGEN SATURATION: 99 %

## 2019-10-09 VITALS — DIASTOLIC BLOOD PRESSURE: 81 MMHG | SYSTOLIC BLOOD PRESSURE: 161 MMHG

## 2019-10-09 DIAGNOSIS — E11.9: ICD-10-CM

## 2019-10-09 DIAGNOSIS — E78.5: ICD-10-CM

## 2019-10-09 DIAGNOSIS — Z95.5: ICD-10-CM

## 2019-10-09 DIAGNOSIS — I50.9: ICD-10-CM

## 2019-10-09 DIAGNOSIS — R06.02: Primary | ICD-10-CM

## 2019-10-09 DIAGNOSIS — I10: ICD-10-CM

## 2019-10-09 LAB
ALBUMIN SERPL BCP-MCNC: 3.5 G/DL (ref 3.4–5)
ALP SERPL-CCNC: 107 U/L (ref 45–117)
ALT SERPL W P-5'-P-CCNC: 27 U/L (ref 12–78)
AST SERPL W P-5'-P-CCNC: 17 U/L (ref 15–37)
BUN BLD-MCNC: 18 MG/DL (ref 7–18)
GLUCOSE SERPLBLD-MCNC: 201 MG/DL (ref 74–106)
HCT VFR BLD CALC: 49.3 % (ref 39.6–49)
INR BLD: 0.98
LYMPHOCYTES # SPEC AUTO: 1.4 K/UL (ref 0.7–4.9)
MAGNESIUM SERPL-MCNC: 2.1 MG/DL (ref 1.8–2.4)
NT-PROBNP SERPL-MCNC: 331 PG/ML (ref ?–125)
PMV BLD: 9.1 FL (ref 7.6–11.3)
POTASSIUM SERPL-SCNC: 4 MMOL/L (ref 3.5–5.1)
RBC # BLD: 5.89 M/UL (ref 4.33–5.43)
TROPONIN (EMERG DEPT USE ONLY): < 0.02 NG/ML (ref 0–0.04)

## 2019-10-09 PROCEDURE — 85025 COMPLETE CBC W/AUTO DIFF WBC: CPT

## 2019-10-09 PROCEDURE — 83735 ASSAY OF MAGNESIUM: CPT

## 2019-10-09 PROCEDURE — 93005 ELECTROCARDIOGRAM TRACING: CPT

## 2019-10-09 PROCEDURE — 36415 COLL VENOUS BLD VENIPUNCTURE: CPT

## 2019-10-09 PROCEDURE — 71045 X-RAY EXAM CHEST 1 VIEW: CPT

## 2019-10-09 PROCEDURE — 80048 BASIC METABOLIC PNL TOTAL CA: CPT

## 2019-10-09 PROCEDURE — 99284 EMERGENCY DEPT VISIT MOD MDM: CPT

## 2019-10-09 PROCEDURE — 85610 PROTHROMBIN TIME: CPT

## 2019-10-09 PROCEDURE — 84484 ASSAY OF TROPONIN QUANT: CPT

## 2019-10-09 PROCEDURE — 80076 HEPATIC FUNCTION PANEL: CPT

## 2019-10-09 PROCEDURE — 83880 ASSAY OF NATRIURETIC PEPTIDE: CPT

## 2019-10-09 NOTE — ER
Nurse's Notes                                                                                     

 [facility]                                                                 

Name: Liam Díaz                                                                             

Age: 64 yrs                                                                                       

Sex: Male                                                                                         

: 1955                                                                                   

MRN: [id number]                                                                                   

Arrival Date: 10/09/2019                                                                          

Time: 05:40                                                                                       

Account#: [id number]                                                                            

Bed 18                                                                                            

Private MD:                                                                                       

Diagnosis: Shortness of breath                                                                    

                                                                                                  

Presentation:                                                                                     

10/09                                                                                             

05:45 Presenting complaint: Patient states: "I've been having shortness of breath for the     cc3 

      past 2-3 days and today morning I was awakened by feeling irritable and shortness of        

      breath and wasn't able to go back to sleep". Transition of care: patient was not            

      received from another setting of care. Onset of symptoms was 2019. Risk         

      Assessment: Do you want to hurt yourself or someone else? Patient reports no desire to      

      harm self or others. Initial Sepsis Screen: Does the patient meet any 2 criteria? No.       

      Patient's initial sepsis screen is negative. Does the patient have a suspected source       

      of infection? No. Patient's initial sepsis screen is negative. Care prior to arrival:       

      None.                                                                                       

05:45 Method Of Arrival: Ambulatory                                                           cc3 

05:45 Acuity: MANUEL 3                                                                           cc3 

                                                                                                  

Triage Assessment:                                                                                

05:45 General: Appears in no apparent distress. uncomfortable, Behavior is calm, cooperative, cc3 

      appropriate for age. Pain: Denies pain. EENT: No signs and/or symptoms were reported        

      regarding the EENT system. Neuro: Level of Consciousness is awake, alert, obeys             

      commands, Oriented to person, place, time, situation, Appropriate for age.                  

      Cardiovascular: Denies chest pain, lightheadedness, nausea, palpitations, syncope,          

      vomiting, Heart tones S1 S2 present Capillary refill in bilateral fingers Patient's         

      skin is warm and dry. Rhythm is sinus rhythm. Respiratory: Reports shortness of breath      

      at rest on exertion since 2-3 days Airway is patent Respiratory effort is even,             

      unlabored, Respiratory pattern is regular, symmetrical, Breath sounds are clear             

      bilaterally. Onset: The symptoms/episode began/occurred 2-3 days, the patient has mild      

      shortness of breath. GI: Abdomen is round obese. : No signs and/or symptoms were          

      reported regarding the genitourinary system. Derm: Skin is intact, is healthy with good     

      turgor, Skin is flushed, Skin temperature is warm. Musculoskeletal: Circulation,            

      motion, and sensation intact. Range of motion: intact in all extremities.                   

                                                                                                  

Historical:                                                                                       

- Allergies:                                                                                      

05:45 No Known Allergies;                                                                     cc3 

- Home Meds:                                                                                      

05:45 amlodipine-benazepril 5-40 mg Oral cap 1 cap once daily [Active]; Centrum Silver        cc3 

      0.4-300-250 mg-mcg-mcg Oral tab daily [Active]; Cinnamon 500 mg Oral cap daily              

      [Active]; Ecotrin 81 mg Oral TbEC 1 tab once daily [Active]; escitalopram oxalate 20 mg     

      Oral tab 1 tab once daily [Active]; iron 65 mg Oral once daily [Active]; metoprolol         

      tartrate 25 mg Oral tab 1 tab 2 times per day [Active]; Novolin R 40 units Sub-Q daily      

      [Active]; Omega 3-6-9 40-60-10 mg-mg-unit Oral cap daily [Active]; omeprazole 40 mg         

      Oral cpDR 1 cap 2 times per day [Active]; potassium chloride 20 mEq Oral TbER 1 tab         

      once daily [Active]; rosuvastatin 20 mg Oral tab 1 tab once daily [Active]; Vascepa 1       

      gram Oral cap daily [Active]; Vitamin C Oral twice a day [Active];                          

- PMHx:                                                                                           

05:45 Diabetes - IDDM; Hyperlipidemia; Hypertension; CHF;                                     cc3 

- PSHx:                                                                                           

05:45 Heart stents;                                                                           cc3 

                                                                                                  

- Immunization history:: Adult Immunizations up to date.                                          

- Social history:: Smoking status: Patient/guardian denies using tobacco, the patient             

  reports quitting approximately 25 years ago.                                                    

- Ebola Screening: : No symptoms or risks identified at this time.                                

                                                                                                  

                                                                                                  

Screenin:45 Abuse screen: Denies threats or abuse. Denies injuries from another. Nutritional        cc3 

      screening: No deficits noted. Tuberculosis screening: No symptoms or risk factors           

      identified. Fall Risk Ambulatory Aid- None/Bed Rest/Nurse Assist (0 pts). Gait-             

      Normal/Bed Rest/Wheelchair (0 pts) Mental Status- Oriented to own ability (0 pts).          

                                                                                                  

Assessment:                                                                                       

05:45 General: see triage assessment.                                                         cc3 

06:30 Reassessment: Patient appears in no apparent distress at this time. Patient and/or      cc3 

      family updated on plan of care and expected duration. Pain level reassessed. Patient is     

      alert, oriented x 3, equal unlabored respirations, skin warm/dry/pink. Patient denies       

      pain at this time.                                                                          

07:15 Reassessment: Pt sitting up in bed watching TV. Equal unlabored respirations, skin is   aa5 

      pink/warm/dry. Face is red. Pt states no complaints at this time. Pt notified of wait       

      time for disposition. .                                                                     

07:15 Cardiovascular: Rhythm is sinus rhythm. Respiratory: Breath sounds are diminished       aa5 

      bilaterally.                                                                                

07:24 Reassessment: PA at bedside .                                                           aa5 

08:18 Reassessment: Patient is alert, oriented x 3, equal unlabored respirations, skin        aa5 

      warm/dry/pink.                                                                              

                                                                                                  

Vital Signs:                                                                                      

05:45  / 84; Pulse 81; Resp 19 S; Temp 98.1(O); Pulse Ox 99% on R/A; Weight 122.47 kg   cc3 

      (R); Height 5 ft. 11 in. (180.34 cm) (R); Pain 0/10;                                        

06:45  / 81; Pulse 74; Resp 18 S; Pulse Ox 99% on 2 lpm NC; Pain 0/10;                  cc3 

07:20  / 87; Pulse 73; Resp 20 S; Pulse Ox 99% on 2 lpm NC;                             aa5 

08:00  / 84; Pulse 71; Resp 18 S; Pulse Ox 98% on R/A; Pain 0/10;                       aa5 

05:45 Body Mass Index 37.66 (122.47 kg, 180.34 cm)                                            cc3 

                                                                                                  

ED Course:                                                                                        

05:40 Patient arrived in ED.                                                                  ds1 

05:43 Wolfgang Juan PA is PHCP.                                                               [id number] 

05:43 Candelario Walker MD is Attending Physician.                                             [id number] 

05:45 Arm band placed on right wrist. EKG completed in triage. Results shown to MD.           cc3 

05:45 Patient has correct armband on for positive identification. Placed in gown. Bed in low  cc3 

      position. Call light in reach. Side rails up X2. Cardiac monitor on. Pulse ox on. NIBP      

      on.                                                                                         

05:49 Elisa Guy is Primary Nurse.                                                      cc3 

05:57 Triage completed.                                                                       cc3 

05:59 Inserted saline lock: 20 gauge in right antecubital area, using aseptic technique.      ao  

      Blood collected.                                                                            

07:00 Report given to LESLEE Moore.                                                               cc3 

07:00 Report received from LESLEE Pérez.                                                      aa5 

07:01 XRAY Chest (1 view) In Process Unspecified.                                             EDMS

07:02 Teresa Jones, RN is Primary Nurse.                                                   aa5 

08:18 No provider procedures requiring assistance completed. IV discontinued, intact,         aa5 

      bleeding controlled, No redness/swelling at site. Pressure dressing applied.                

                                                                                                  

Administered Medications:                                                                         

06:50 Drug: hydrOXYzine 50 mg Route: PO;                                                      cc3 

07:01 Follow up: Response: No adverse reaction                                                cc3 

                                                                                                  

                                                                                                  

Outcome:                                                                                          

07:34 Discharge ordered by MD.                                                                [place] 

08:18 Discharged to home ambulatory.                                                          aa5 

08:18 Condition: stable                                                                           

08:18 Discharge instructions given to patient, Instructed on discharge instructions, follow       

      up and referral plans. Demonstrated understanding of instructions, follow-up care.          

08:23 Patient left the ED.                                                                    aa5 

                                                                                                  

Signatures:                                                                                       

Dispatcher [employer]                           [facility]                                                 

Anali Jones                                ds1                                                  

Teresa Jones, RN                     RN   aa5                                                  

Wolfgang Juan PA PA   jr8                                                  

Tylor Rothman RN RN ao Cordel, Charlene                             cc3                                                  

                                                                                                  

Corrections: (The following items were deleted from the chart)                                    

06:39 05:45 Respiratory: Reports shortness of breath at rest on exertion since 2-3 days       cc3 

      Airway is patent Respiratory effort is even, unlabored, Respiratory pattern is regular,     

      symmetrical, Onset: The symptoms/episode began/occurred 2-3 days, the patient has mild      

      shortness of breath cc3                                                                     

06:43 05:45 Respiratory: Reports shortness of breath at rest on exertion since 2-3 days       cc3 

      Airway is patent Respiratory effort is even, unlabored, Respiratory pattern is regular,     

      symmetrical, Onset: The symptoms/episode began/occurred 2-3 days, the patient has mild      

      shortness of breath cc3                                                                     

                                                                                                  

**************************************************************************************************

## 2019-10-09 NOTE — EKG
Test Date:    2019-10-09               Test Time:    05:52:44

Technician:   SIDNEY                                    

                                                     

MEASUREMENT RESULTS:                                       

Intervals:                                           

Rate:         73                                     

CO:           170                                    

QRSD:         106                                    

QT:           380                                    

QTc:          418                                    

Axis:                                                

P:            32                                     

CO:           170                                    

QRS:          60                                     

T:            53                                     

                                                     

INTERPRETIVE STATEMENTS:                                       

                                                     

Normal sinus rhythm

Septal infarct, age undetermined

Abnormal ECG

Compared to ECG 06/21/2019 07:44:38

Myocardial infarct finding now present



Electronically Signed On 10-09-19 09:31:59 CDT by Ganesh Lui

## 2019-10-09 NOTE — RAD REPORT
EXAM DESCRIPTION:  RAD - Chest Single View - 10/9/2019 6:59 am

 

CLINICAL HISTORY:  SOB

Chest pain.

 

COMPARISON:  No comparisons

 

FINDINGS:  Portable technique limits examination quality.

 

The lungs are emphysematous but grossly clear. The heart is upper limit of normal in size. No displac
ed fractures.

 

IMPRESSION:  COPD.

## 2019-10-09 NOTE — EDPHYS
Physician Documentation                                                                           

 [facility]                                                                 

Name: Liam Díaz                                                                             

Age: 64 yrs                                                                                       

Sex: Male                                                                                         

: 1955                                                                                   

MRN: [id number]                                                                                   

Arrival Date: 10/09/2019                                                                          

Time: 05:40                                                                                       

Account#: [id number]                                                                            

Bed 18                                                                                            

Private MD:                                                                                       

ED Physician Candelario Walker                                                                      

HPI:                                                                                              

10/09                                                                                             

06:34 This 64 yrs old  Male presents to ER via Ambulatory with complaints of         jr8 

      Shortness Of Breath.                                                                        

06:34 The patient has shortness of breath at rest, that woke him/her from sleep. Onset: The   jr8 

      symptoms/episode began/occurred last week. Associated signs and symptoms: Pertinent         

      negatives: chest pain, non-productive cough, productive cough, diaphoresis, dizziness,      

      fever, hemoptysis, loss of consciousness, nausea, visual changes. The patient has           

      experienced similar episodes in the past. PT reports feeling more SOB over the last         

      week and waking up at night the last three nights feeling SOB. Reports being taken off      

      of his lasix by Dr. Dhillon but has taken a dose once a day the last couple days.             

                                                                                                  

Historical:                                                                                       

- Allergies:                                                                                      

05:45 No Known Allergies;                                                                     cc3 

- Home Meds:                                                                                      

05:45 amlodipine-benazepril 5-40 mg Oral cap 1 cap once daily [Active]; Centrum Silver        cc3 

      0.4-300-250 mg-mcg-mcg Oral tab daily [Active]; Cinnamon 500 mg Oral cap daily              

      [Active]; Ecotrin 81 mg Oral TbEC 1 tab once daily [Active]; escitalopram oxalate 20 mg     

      Oral tab 1 tab once daily [Active]; iron 65 mg Oral once daily [Active]; metoprolol         

      tartrate 25 mg Oral tab 1 tab 2 times per day [Active]; Novolin R 40 units Sub-Q daily      

      [Active]; Omega 3-6-9 40-60-10 mg-mg-unit Oral cap daily [Active]; omeprazole 40 mg         

      Oral cpDR 1 cap 2 times per day [Active]; potassium chloride 20 mEq Oral TbER 1 tab         

      once daily [Active]; rosuvastatin 20 mg Oral tab 1 tab once daily [Active]; Vascepa 1       

      gram Oral cap daily [Active]; Vitamin C Oral twice a day [Active];                          

- PMHx:                                                                                           

05:45 Diabetes - IDDM; Hyperlipidemia; Hypertension; CHF;                                     cc3 

- PSHx:                                                                                           

05:45 Heart stents;                                                                           cc3 

                                                                                                  

- Immunization history:: Adult Immunizations up to date.                                          

- Social history:: Smoking status: Patient/guardian denies using tobacco, the patient             

  reports quitting approximately 25 years ago.                                                    

- Ebola Screening: : No symptoms or risks identified at this time.                                

                                                                                                  

                                                                                                  

ROS:                                                                                              

06:34 Constitutional: Negative for fever, chills, and weight loss, Eyes: Negative for injury, jr8 

      pain, redness, and discharge, ENT: Negative for injury, pain, and discharge, Neck:          

      Negative for injury, pain, and swelling, Cardiovascular: Negative for chest pain,           

      palpitations, and edema, Abdomen/GI: Negative for abdominal pain, nausea, vomiting,         

      diarrhea, and constipation, Back: Negative for injury and pain, MS/Extremity: Negative      

      for injury and deformity, Neuro: Negative for headache, weakness, numbness, tingling,       

      and seizure.                                                                                

06:34 Respiratory: Positive for orthopnea, shortness of breath, Negative for cough.               

                                                                                                  

Exam:                                                                                             

06:34 Chest/axilla:  Normal chest wall appearance and motion.  Nontender with no deformity.   jr8 

      No lesions are appreciated. Cardiovascular:  Regular rate and rhythm with a normal S1       

      and S2.  No gallops, murmurs, or rubs.  Normal PMI, no JVD.  No pulse deficits.             

      Respiratory:  Lungs have equal breath sounds bilaterally, clear to auscultation and         

      percussion.  No rales, rhonchi or wheezes noted.  No increased work of breathing, no        

      retractions or nasal flaring. Abdomen/GI:  Soft, non-tender, with normal bowel sounds.      

      No distension or tympany.  No guarding or rebound.  No evidence of tenderness               

      throughout. Back:  No spinal tenderness.  No costovertebral tenderness.  Full range of      

      motion. Neuro:  Awake and alert, GCS 15, oriented to person, place, time, and               

      situation.  Cranial nerves II-XII grossly intact.  Motor strength 5/5 in all                

      extremities.  Sensory grossly intact.  Cerebellar exam normal.  Normal gait.                

06:34 Constitutional: The patient appears in no acute distress, alert, awake, comfortable.        

06:34 Head/face: Exam is negative for acute changes.                                              

06:34 Eyes: Periorbital structures: appear normal, Pupils: equal, round, and reactive to          

      light and accomodation, Extraocular movements: no acute changes.                            

                                                                                                  

Vital Signs:                                                                                      

05:45  / 84; Pulse 81; Resp 19 S; Temp 98.1(O); Pulse Ox 99% on R/A; Weight 122.47 kg   cc3 

      (R); Height 5 ft. 11 in. (180.34 cm) (R); Pain 0/10;                                        

06:45  / 81; Pulse 74; Resp 18 S; Pulse Ox 99% on 2 lpm NC; Pain 0/10;                  cc3 

07:20  / 87; Pulse 73; Resp 20 S; Pulse Ox 99% on 2 lpm NC;                             aa5 

08:00  / 84; Pulse 71; Resp 18 S; Pulse Ox 98% on R/A; Pain 0/10;                       aa5 

05:45 Body Mass Index 37.66 (122.47 kg, 180.34 cm)                                            cc3 

                                                                                                  

MDM:                                                                                              

05:43 Patient medically screened.                                                             jr8 

07:31 Data reviewed: vital signs, nurses notes, lab test result(s), EKG, radiologic studies,  jr8 

      and as a result, I will discharge patient. Data interpreted: Pulse oximetry: on room        

      air is 99 %. Interpretation: normal. Counseling: I had a detailed discussion with the       

      patient and/or guardian regarding: the historical points, exam findings, and any            

      diagnostic results supporting the discharge/admit diagnosis, lab results, radiology         

      results, the need for outpatient follow up, a cardiologist, a family practitioner,          

      Discussed need to echo with cards and sleep study to rule out CINTIA. ED course: Pt            

      without evidence of fluid overload on physical exam or CXR, in no distress. .               

                                                                                                  

10/09                                                                                             

06:01 Order name: Basic Metabolic Panel; Complete Time: 06:51                                 cc3 

10/09                                                                                             

06:01 Order name: CBC with Diff; Complete Time: 07:08                                         cc3 

10/09                                                                                             

06:01 Order name: LFT's; Complete Time: 06:51                                                 cc3 

10/09                                                                                             

06:01 Order name: Magnesium; Complete Time: 06:51                                             cc3 

10/09                                                                                             

06:01 Order name: NT PRO-BNP; Complete Time: 06:46                                            cc3 

10/09                                                                                             

06:01 Order name: PT-INR; Complete Time: 07:08                                                cc3 

10/09                                                                                             

06:01 Order name: Troponin (emerg Dept Use Only); Complete Time: 06:51                        cc3 

10/09                                                                                             

06:01 Order name: XRAY Chest (1 view); Complete Time: 07:18                                   cc3 

10/09                                                                                             

06:01 Order name: EKG; Complete Time: 06:02                                                   cc3 

10/09                                                                                             

06:01 Order name: Cardiac monitoring; Complete Time: 06:01                                    cc3 

10/09                                                                                             

06:01 Order name: EKG - Nurse/Tech; Complete Time: 06:02                                      cc3 

10/09                                                                                             

06:01 Order name: IV Saline Lock; Complete Time: 06:27                                        cc3 

10/09                                                                                             

06:01 Order name: Labs collected and sent; Complete Time: 06:27                               cc3 

10/09                                                                                             

06:01 Order name: O2 Per Protocol; Complete Time: 06:02                                       cc3 

10/09                                                                                             

06:01 Order name: O2 Sat Monitoring; Complete Time: 06:02                                     cc3 

                                                                                                  

Administered Medications:                                                                         

06:50 Drug: hydrOXYzine 50 mg Route: PO;                                                      cc3 

07:01 Follow up: Response: No adverse reaction                                                cc3 

                                                                                                  

                                                                                                  

Disposition:                                                                                      

10/10                                                                                             

07:08 Co-signature as Attending Physician, Candelario Walker MD. Available for consultation at   [facility] 

      all times. Chart complete. Chart complete.                                                  

                                                                                                  

Disposition:                                                                                      

10/09/19 07:34 Discharged to Home. Impression: Shortness of breath.                               

- Condition is Stable.                                                                            

- Discharge Instructions: Shortness of Breath, Sleep Studies.                                     

                                                                                                  

- Medication Reconciliation Form, Thank You Letter form.                                          

- Follow up: Private Physician; When: 2 - 3 days; Reason: Recheck today's complaints,             

  Re-evaluation by your physician.                                                                

- Problem is new.                                                                                 

- Symptoms have improved.                                                                         

                                                                                                  

                                                                                                  

                                                                                                  

Signatures:                                                                                       

Dispatcher [employer]                           EDMS                                                 

Teresa Jones RN                     RN   aa5                                                  

Wolfgang Juan PA                        PA   jr8                                                  

Candelario Walker MD MD   ps1                                                  

Elisa Guy                             cc3                                                  

                                                                                                  

Corrections: (The following items were deleted from the chart)                                    

10/09                                                                                             

08:23 07:34 10/09/2019 07:34 Discharged to Home. Impression: Shortness of breath. Condition   aa5 

      is Stable. Forms are Medication Reconciliation Form, Thank You Letter, Antibiotic           

      Education, Prescription Opioid Use. Follow up: Private Physician; When: 2 - 3 days;         

      Reason: Recheck today's complaints, Re-evaluation by your physician. Problem is new.        

      Symptoms have improved. jr8                                                                 

                                                                                                  

**************************************************************************************************

## 2019-11-05 ENCOUNTER — HOSPITAL ENCOUNTER (EMERGENCY)
Dept: HOSPITAL 97 - ER | Age: 64
Discharge: HOME | End: 2019-11-05
Payer: COMMERCIAL

## 2019-11-05 VITALS — SYSTOLIC BLOOD PRESSURE: 127 MMHG | OXYGEN SATURATION: 98 % | DIASTOLIC BLOOD PRESSURE: 67 MMHG

## 2019-11-05 VITALS — TEMPERATURE: 97.5 F

## 2019-11-05 DIAGNOSIS — I10: ICD-10-CM
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
## 2019-11-05 DIAGNOSIS — E11.9: ICD-10-CM

## 2019-11-05 DIAGNOSIS — Z79.4: ICD-10-CM

## 2019-11-05 DIAGNOSIS — E78.5: ICD-10-CM
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
## 2019-11-05 DIAGNOSIS — I50.9: ICD-10-CM

## 2019-11-05 DIAGNOSIS — S32.2XXA: Primary | ICD-10-CM
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
## 2019-11-05 PROCEDURE — 72220 X-RAY EXAM SACRUM TAILBONE: CPT

## 2019-11-05 PROCEDURE — 99283 EMERGENCY DEPT VISIT LOW MDM: CPT

## 2019-11-05 NOTE — ER
Nurse's Notes                                                                                     

 [facility]                                                                 

Name: Liam Díaz                                                                             

Age: 64 yrs                                                                                       

Sex: Male                                                                                         

: 1955                                                                                   

MRN: [id number]                                                                                   

Arrival Date: 2019                                                                          

Time: 09:34                                                                                       

Account#: [id number]                                                                            

Bed 19                                                                                            

Private MD:                                                                                       

Diagnosis: Fracture of coccyx                                                                     

                                                                                                  

Presentation:                                                                                     

                                                                                             

09:39 Presenting complaint: Tailbone pain 8/10 after he fell from stepladder and landed on      

      buttocks 4 days ago. Care prior to arrival: None.                                           

09:39 Acuity: MANUEL 4                                                                             

09:39 Method Of Arrival: Ambulatory                                                             

09:44 Transition of care: patient was not received from another setting of care. Onset of     hb  

      symptoms was 2019. Risk Assessment: Do you want to hurt yourself or            

      someone else? Patient reports no desire to harm self or others. Initial Sepsis Screen:      

      Does the patient meet any 2 criteria? No. Patient's initial sepsis screen is negative.      

      Does the patient have a suspected source of infection? No. Patient's initial sepsis         

      screen is negative.                                                                         

                                                                                                  

Triage Assessment:                                                                                

09:42 General: Appears in no apparent distress. uncomfortable, Behavior is calm, cooperative. hb  

      Pain: Pain currently is 8 out of 10 on a pain scale. EENT: No signs and/or symptoms         

      were reported regarding the EENT system. Neuro: Level of Consciousness is awake, alert,     

      obeys commands, Oriented to person, place, time, situation. Cardiovascular: Capillary       

      refill < 3 seconds Patient's skin is warm and dry. Respiratory: Airway is patent            

      Respiratory effort is even, unlabored, Respiratory pattern is regular, symmetrical. GI:     

      No signs and/or symptoms were reported involving the gastrointestinal system. : No        

      signs and/or symptoms were reported regarding the genitourinary system. Derm: Skin is       

      intact, is healthy with good turgor. Musculoskeletal: Reports tailbone pain.                

                                                                                                  

Historical:                                                                                       

- Allergies:                                                                                      

09:42 No Known Allergies;                                                                     hb  

- Home Meds:                                                                                      

09:42 amlodipine-benazepril 5-40 mg Oral cap 1 cap once daily [Active]; Cinnamon 500 mg Oral  hb  

      cap daily [Active]; Ecotrin 81 mg Oral TbEC 1 tab once daily [Active]; escitalopram         

      oxalate 20 mg Oral tab 1 tab once daily [Active]; Centrum Silver 0.4-300-250 mg-mcg-mcg     

      Oral tab daily [Active]; iron 65 mg Oral once daily [Active]; metoprolol tartrate 25 mg     

      Oral tab 1 tab 2 times per day [Active]; Novolin R 40 units Sub-Q daily [Active]; Omega     

      3-6-9 40-60-10 mg-mg-unit Oral cap daily [Active]; omeprazole 40 mg Oral cpDR 1 cap 2       

      times per day [Active]; potassium chloride 20 mEq Oral TbER 1 tab once daily [Active];      

      rosuvastatin 20 mg Oral tab 1 tab once daily [Active]; Vascepa 1 gram Oral cap daily        

      [Active]; Vitamin C Oral twice a day [Active];                                              

- PMHx:                                                                                           

09:42 CHF; Diabetes - IDDM; Hyperlipidemia; Hypertension;                                     hb  

- PSHx:                                                                                           

09:42 Heart stents;                                                                           hb  

                                                                                                  

- Immunization history:: Adult Immunizations up to date.                                          

- Social history:: Smoking status: Patient/guardian denies using tobacco.                         

- Ebola Screening: : No symptoms or risks identified at this time.                                

                                                                                                  

                                                                                                  

Screenin:43 Abuse screen: Denies threats or abuse. Denies injuries from another. Nutritional        hb  

      screening: No deficits noted. Tuberculosis screening: No symptoms or risk factors           

      identified. Fall Risk None identified.                                                      

                                                                                                  

Assessment:                                                                                       

:43 General: see triage assessment.                                                         hb  

11:12 Reassessment: Patient appears in no apparent distress at this time. Patient is alert,   ca1 

      oriented x 3, equal unlabored respirations, skin warm/dry/pink.                             

                                                                                                  

Vital Signs:                                                                                      

09:40  / 79; Pulse 67; Resp 16; Temp 97.5; Pulse Ox 97% ; Weight 124.74 kg; Height 5    hb  

      ft. 11 in. (180.34 cm); Pain 8/10;                                                          

11:12  / 67; Pulse 71; Resp 16 S; Pulse Ox 98% on R/A;                                  ca1 

09:40 Body Mass Index 38.35 (124.74 kg, 180.34 cm)                                            hb  

                                                                                                  

ED Course:                                                                                        

09:34 Patient arrived in ED.                                                                  rg4 

09:39 Genesis Metz, RN is Primary Nurse.                                                   hb  

09:40 Triage completed.                                                                       hb  

09:40 [facility] Childers [facility]C [facility]                                                        kb  

09:40 Tremaine Pisano MD is Attending Physician.                                              kb  

09:40 Arm band placed on.                                                                     hb  

09:43 Patient has correct armband on for positive identification. Call light in reach.        hb  

10:54 Sacrum And Coccyx XRAY In Process Unspecified.                                          EDMS

11:13 No provider procedures requiring assistance completed. Patient did not have IV access   ca1 

      during this emergency room visit.                                                           

                                                                                                  

Administered Medications:                                                                         

No medications were administered                                                                  

                                                                                                  

                                                                                                  

Outcome:                                                                                          

11:04 Discharge ordered by MD.                                                                kb  

11:13 Discharged to home ambulatory.                                                          ca1 

11:13 Condition: stable                                                                           

11:13 Discharge instructions given to patient, Instructed on discharge instructions, follow       

      up and referral plans. no drinking with medication, no driving heavy equipment,             

      medication usage, Demonstrated understanding of instructions, follow-up care,               

      medications, Prescriptions given X 1.                                                       

11:13 Patient left the ED.                                                                    ca1 

                                                                                                  

Signatures:                                                                                       

Dispatcher [employer]                           EDMS                                                 

Clare Childers, FNP-C                 FNP-Genesis Mayen, RN                     RN   Bia Westfall                                 rg4                                                  

Samira Cannon RN                        RN   ca1                                                  

                                                                                                  

**************************************************************************************************

## 2019-11-05 NOTE — RAD REPORT
EXAM DESCRIPTION:  RAD - Sacrum And Coccyx - 11/5/2019 10:49 am

 

FINDINGS:  No definitive fracture line is seen. The lower 2 coccygeal segments are displaced posterio
rly which could be trauma in origin.

 

No pathologic bone process. No presacral soft tissue thickening.

## 2019-11-05 NOTE — EDPHYS
Physician Documentation                                                                           

 [facility]                                                                 

Name: Liam Díaz                                                                             

Age: 64 yrs                                                                                       

Sex: Male                                                                                         

: 1955                                                                                   

MRN: [id number]                                                                                   

Arrival Date: 2019                                                                          

Time: 09:34                                                                                       

Account#: [id number]                                                                            

Bed 19                                                                                            

Private MD:                                                                                       

ED Physician Tremaine Pisano                                                                       

HPI:                                                                                              

                                                                                             

10:58 This 64 yrs old  Male presents to ER via Ambulatory with complaints of Buttock kb  

      Injury.                                                                                     

10:58 The patient presents with pain that is acute, and tenderness. The symptoms are located  kb  

      in the coccyx area. Onset: The symptoms/episode began/occurred 4 day(s) ago. The pain       

      does not radiate. Associated signs and symptoms: The patient has no apparent associated     

      signs or symptoms. The problem was sustained during a fall, from step-stool. Modifying      

      factors: The patient symptoms are alleviated by nothing, the patient symptoms are           

      aggravated by any movement. Severity of symptoms: At their worst the symptoms were          

      moderate, in the emergency department the symptoms are unchanged. The patient has not       

      experienced similar symptoms in the past. The patient has not recently seen a               

      physician. Pt reports he fell from a step-stool 4 days ago and landed on his buttocks.      

      States he has had pain to tailbone since then that he thought would go away on its own,     

      but hasn't.                                                                                 

                                                                                                  

Historical:                                                                                       

- Allergies:                                                                                      

09:42 No Known Allergies;                                                                     hb  

- Home Meds:                                                                                      

09:42 amlodipine-benazepril 5-40 mg Oral cap 1 cap once daily [Active]; Cinnamon 500 mg Oral  hb  

      cap daily [Active]; Ecotrin 81 mg Oral TbEC 1 tab once daily [Active]; escitalopram         

      oxalate 20 mg Oral tab 1 tab once daily [Active]; Centrum Silver 0.4-300-250 mg-mcg-mcg     

      Oral tab daily [Active]; iron 65 mg Oral once daily [Active]; metoprolol tartrate 25 mg     

      Oral tab 1 tab 2 times per day [Active]; Novolin R 40 units Sub-Q daily [Active]; Omega     

      3-6-9 40-60-10 mg-mg-unit Oral cap daily [Active]; omeprazole 40 mg Oral cpDR 1 cap 2       

      times per day [Active]; potassium chloride 20 mEq Oral TbER 1 tab once daily [Active];      

      rosuvastatin 20 mg Oral tab 1 tab once daily [Active]; Vascepa 1 gram Oral cap daily        

      [Active]; Vitamin C Oral twice a day [Active];                                              

- PMHx:                                                                                           

09:42 CHF; Diabetes - IDDM; Hyperlipidemia; Hypertension;                                     hb  

- PSHx:                                                                                           

09:42 Heart stents;                                                                           hb  

                                                                                                  

- Immunization history:: Adult Immunizations up to date.                                          

- Social history:: Smoking status: Patient/guardian denies using tobacco.                         

- Ebola Screening: : No symptoms or risks identified at this time.                                

                                                                                                  

                                                                                                  

ROS:                                                                                              

10:58 Constitutional: Negative for fever, chills, and weight loss, ENT: Negative for injury,  kb  

      pain, and discharge, Neck: Negative for injury, pain, and swelling, Cardiovascular:         

      Negative for chest pain, palpitations, and edema, Respiratory: Negative for shortness       

      of breath, cough, wheezing, and pleuritic chest pain, Abdomen/GI: Negative for              

      abdominal pain, nausea, vomiting, diarrhea, and constipation, : Negative for injury,      

      bleeding, discharge, and swelling, MS/Extremity: Negative for injury and deformity,         

      Skin: Negative for injury, rash, and discoloration, Neuro: Negative for headache,           

      weakness, numbness, tingling, and seizure.                                                  

10:58 Back: Positive for pain at rest, pain with movement.                                        

                                                                                                  

Exam:                                                                                             

10:57 Constitutional:  This is a well developed, well nourished patient who is awake, alert,  kb  

      and in no acute distress. Head/Face:  Normocephalic, atraumatic. Neck:  Trachea             

      midline, no thyromegaly or masses palpated, and no cervical lymphadenopathy.  Supple,       

      full range of motion without nuchal rigidity, or vertebral point tenderness.  No            

      Meningismus. Chest/axilla:  Normal chest wall appearance and motion.  Nontender with no     

      deformity.  No lesions are appreciated. Cardiovascular:  Regular rate and rhythm with a     

      normal S1 and S2.  No gallops, murmurs, or rubs.  Normal PMI, no JVD.  No pulse             

      deficits. Respiratory:  Lungs have equal breath sounds bilaterally, clear to                

      auscultation and percussion.  No rales, rhonchi or wheezes noted.  No increased work of     

      breathing, no retractions or nasal flaring. Abdomen/GI:  Soft, non-tender, with normal      

      bowel sounds.  No distension or tympany.  No guarding or rebound.  No evidence of           

      tenderness throughout. Skin:  Warm, dry with normal turgor.  Normal color with no           

      rashes, no lesions, and no evidence of cellulitis. MS/ Extremity:  Pulses equal, no         

      cyanosis.  Neurovascular intact.  Full, normal range of motion. Neuro:  Awake and           

      alert, GCS 15, oriented to person, place, time, and situation.  Cranial nerves II-XII       

      grossly intact.  Motor strength 5/5 in all extremities.  Sensory grossly intact.            

      Cerebellar exam normal.  Normal gait.                                                       

10:57 Back: pain, that is moderate, of the  sacrum, ROM is normal, normal spinal alignment        

      noted.                                                                                      

                                                                                                  

Vital Signs:                                                                                      

09:40  / 79; Pulse 67; Resp 16; Temp 97.5; Pulse Ox 97% ; Weight 124.74 kg; Height 5    hb  

      ft. 11 in. (180.34 cm); Pain 8/10;                                                          

11:12  / 67; Pulse 71; Resp 16 S; Pulse Ox 98% on R/A;                                  ca1 

09:40 Body Mass Index 38.35 (124.74 kg, 180.34 cm)                                            hb  

                                                                                                  

MDM:                                                                                              

09:40 Patient medically screened.                                                             kb  

10:57 Data reviewed: vital signs, nurses notes. Data interpreted: Pulse oximetry: on room air kb  

      is 97 %. Interpretation: normal.                                                            

11:02 Test interpretation: by ED physician or midlevel provider: plain radiologic studies,    simona  

      fracture of coccyx. Counseling: I had a detailed discussion with the patient and/or         

      guardian regarding: the historical points, exam findings, and any diagnostic results        

      supporting the discharge/admit diagnosis, radiology results, the need for outpatient        

      follow up, a family practitioner, to return to the emergency department if symptoms         

      worsen or persist or if there are any questions or concerns that arise at home.             

                                                                                                  

                                                                                             

09:40 Order name: Sacrum And Coccyx XRAY                                                      kb  

                                                                                                  

Administered Medications:                                                                         

No medications were administered                                                                  

                                                                                                  

                                                                                                  

Disposition:                                                                                      

                                                                                             

07:16 Co-signature as Attending Physician, Tremaine Pisano MD I agree with the assessment and   kdr 

      plan of care.                                                                               

                                                                                                  

Disposition:                                                                                      

19 11:04 Discharged to Home. Impression: Fracture of coccyx.                                

- Condition is Stable.                                                                            

- Discharge Instructions: Tailbone Injury, Easy-to-Read.                                          

- Prescriptions for Tramadol 50 mg Oral Tablet - take 1 tablet by ORAL route every 8              

  hours as needed; 12 tablet.                                                                     

- Medication Reconciliation Form, Thank You Letter, Antibiotic Education, Prescription            

  Opioid Use form.                                                                                

- Follow up: Emergency Department; When: As needed; Reason: Worsening of condition.               

  Follow up: Private Physician; When: 2 - 3 days; Reason: Recheck today's complaints,             

  Continuance of care, Re-evaluation by your physician.                                           

                                                                                                  

                                                                                                  

                                                                                                  

Signatures:                                                                                       

Dispatcher [employer]                           EDMS                                                 

Clare Childers, FNP-C                 FNP-Tremaine Duran MD MD   [facility]                                                  

Genesis Metz RN                     RN                                                      

Samira Cannon RN                        RN   ca1                                                  

                                                                                                  

Corrections: (The following items were deleted from the chart)                                    

                                                                                             

11:13 11:04 2019 11:04 Discharged to Home. Impression: Fracture of coccyx. Condition is ca1 

      Stable. Forms are Medication Reconciliation Form, Thank You Letter, Antibiotic              

      Education, Prescription Opioid Use. Follow up: Emergency Department; When: As needed;       

      Reason: Worsening of condition. Follow up: Private Physician; When: 2 - 3 days; Reason:     

      Recheck today's complaints, Continuance of care, Re-evaluation by your physician. kb        

                                                                                                  

**************************************************************************************************